# Patient Record
Sex: MALE | URBAN - METROPOLITAN AREA
[De-identification: names, ages, dates, MRNs, and addresses within clinical notes are randomized per-mention and may not be internally consistent; named-entity substitution may affect disease eponyms.]

---

## 2023-06-01 ENCOUNTER — OFFICE VISIT (OUTPATIENT)
Dept: URBAN - METROPOLITAN AREA CLINIC 19 | Facility: CLINIC | Age: 23
End: 2023-06-01

## 2023-06-16 ENCOUNTER — OFFICE VISIT (OUTPATIENT)
Dept: URBAN - METROPOLITAN AREA CLINIC 111 | Facility: CLINIC | Age: 23
End: 2023-06-16

## 2023-07-03 ENCOUNTER — OFFICE VISIT (OUTPATIENT)
Dept: URBAN - METROPOLITAN AREA CLINIC 111 | Facility: CLINIC | Age: 23
End: 2023-07-03

## 2023-07-05 ENCOUNTER — OFFICE VISIT (OUTPATIENT)
Dept: URBAN - METROPOLITAN AREA CLINIC 111 | Facility: CLINIC | Age: 23
End: 2023-07-05

## 2023-07-19 ENCOUNTER — OFFICE VISIT (OUTPATIENT)
Dept: URBAN - METROPOLITAN AREA CLINIC 12 | Facility: CLINIC | Age: 23
End: 2023-07-19

## 2023-07-25 ENCOUNTER — WEB ENCOUNTER (OUTPATIENT)
Dept: URBAN - METROPOLITAN AREA CLINIC 12 | Facility: CLINIC | Age: 23
End: 2023-07-25

## 2023-07-31 ENCOUNTER — DASHBOARD ENCOUNTERS (OUTPATIENT)
Age: 23
End: 2023-07-31

## 2023-07-31 ENCOUNTER — OFFICE VISIT (OUTPATIENT)
Dept: URBAN - METROPOLITAN AREA CLINIC 12 | Facility: CLINIC | Age: 23
End: 2023-07-31
Payer: COMMERCIAL

## 2023-07-31 ENCOUNTER — LAB OUTSIDE AN ENCOUNTER (OUTPATIENT)
Dept: URBAN - METROPOLITAN AREA CLINIC 12 | Facility: CLINIC | Age: 23
End: 2023-07-31

## 2023-07-31 VITALS
HEART RATE: 65 BPM | DIASTOLIC BLOOD PRESSURE: 64 MMHG | WEIGHT: 112.4 LBS | BODY MASS INDEX: 16.09 KG/M2 | HEIGHT: 70 IN | SYSTOLIC BLOOD PRESSURE: 103 MMHG

## 2023-07-31 DIAGNOSIS — R63.6 UNDERWEIGHT: ICD-10-CM

## 2023-07-31 DIAGNOSIS — R10.13 EPIGASTRIC PAIN: ICD-10-CM

## 2023-07-31 DIAGNOSIS — K90.0 CELIAC DISEASE: ICD-10-CM

## 2023-07-31 PROBLEM — 396331005: Status: ACTIVE | Noted: 2023-07-31

## 2023-07-31 PROCEDURE — 99204 OFFICE O/P NEW MOD 45 MIN: CPT | Performed by: STUDENT IN AN ORGANIZED HEALTH CARE EDUCATION/TRAINING PROGRAM

## 2023-07-31 NOTE — HPI-TODAY'S VISIT:
24 yo M here for evaluation of celiac.  He is here with his mom who helps provide his HPI. Says he is extremely thin. Has been appx 112-115 lbs, difficult for him to gain weight.  He says his stomach hurts. Worse when he eats. Lasts  a few minutes and resolves. Then he will try to eat again.  Does get hungry. Rare NSAID use.  Denies bloating.  Some sensitivity to lactose -- causes diarrhea.  He says he has difficulty gaining weight. Remote EGD at time of celiac diagnosis. No blood in stool  Labs reviewed: Gliadin peptide Ab, IgA -- 25 Gliadin peptide Ab, IgG -- 21 TTG IgA -- 6  Mom has lots of autoimmune diseases -- PBC, Sjogren's, celiac.

## 2023-08-02 ENCOUNTER — LAB OUTSIDE AN ENCOUNTER (OUTPATIENT)
Dept: URBAN - METROPOLITAN AREA CLINIC 23 | Facility: CLINIC | Age: 23
End: 2023-08-02

## 2023-08-08 ENCOUNTER — TELEPHONE ENCOUNTER (OUTPATIENT)
Dept: URBAN - METROPOLITAN AREA CLINIC 23 | Facility: CLINIC | Age: 23
End: 2023-08-08

## 2023-08-08 ENCOUNTER — OFFICE VISIT (OUTPATIENT)
Dept: URBAN - METROPOLITAN AREA LAB 3 | Facility: LAB | Age: 23
End: 2023-08-08
Payer: COMMERCIAL

## 2023-08-08 DIAGNOSIS — K29.80 ACUTE DUODENITIS: ICD-10-CM

## 2023-08-08 DIAGNOSIS — K29.60 ADENOPAPILLOMATOSIS GASTRICA: ICD-10-CM

## 2023-08-08 PROCEDURE — 43239 EGD BIOPSY SINGLE/MULTIPLE: CPT | Performed by: STUDENT IN AN ORGANIZED HEALTH CARE EDUCATION/TRAINING PROGRAM

## 2023-08-10 ENCOUNTER — TELEPHONE ENCOUNTER (OUTPATIENT)
Dept: URBAN - METROPOLITAN AREA CLINIC 6 | Facility: CLINIC | Age: 23
End: 2023-08-10

## 2023-08-10 RX ORDER — PANTOPRAZOLE SODIUM 40 MG/1
1 TABLET TABLET, DELAYED RELEASE ORAL ONCE A DAY
Qty: 90 TABLET | Refills: 3 | OUTPATIENT
Start: 2023-08-11

## 2023-08-11 ENCOUNTER — TELEPHONE ENCOUNTER (OUTPATIENT)
Dept: URBAN - METROPOLITAN AREA CLINIC 12 | Facility: CLINIC | Age: 23
End: 2023-08-11

## 2023-08-22 ENCOUNTER — TELEPHONE ENCOUNTER (OUTPATIENT)
Dept: URBAN - METROPOLITAN AREA CLINIC 6 | Facility: CLINIC | Age: 23
End: 2023-08-22

## 2023-09-03 ENCOUNTER — WEB ENCOUNTER (OUTPATIENT)
Dept: URBAN - METROPOLITAN AREA CLINIC 12 | Facility: CLINIC | Age: 23
End: 2023-09-03